# Patient Record
Sex: MALE | Race: WHITE | NOT HISPANIC OR LATINO | URBAN - METROPOLITAN AREA
[De-identification: names, ages, dates, MRNs, and addresses within clinical notes are randomized per-mention and may not be internally consistent; named-entity substitution may affect disease eponyms.]

---

## 2017-01-23 ENCOUNTER — OFFICE VISIT (OUTPATIENT)
Dept: FAMILY MEDICINE | Facility: CLINIC | Age: 27
End: 2017-01-23
Payer: COMMERCIAL

## 2017-01-23 VITALS
SYSTOLIC BLOOD PRESSURE: 126 MMHG | RESPIRATION RATE: 12 BRPM | TEMPERATURE: 97 F | DIASTOLIC BLOOD PRESSURE: 74 MMHG | BODY MASS INDEX: 42.66 KG/M2 | HEART RATE: 98 BPM | WEIGHT: 315 LBS | HEIGHT: 72 IN | OXYGEN SATURATION: 100 %

## 2017-01-23 DIAGNOSIS — F90.9 ATTENTION DEFICIT HYPERACTIVITY DISORDER (ADHD), UNSPECIFIED ADHD TYPE: ICD-10-CM

## 2017-01-23 DIAGNOSIS — F32.0 MAJOR DEPRESSIVE DISORDER, SINGLE EPISODE, MILD (H): Primary | ICD-10-CM

## 2017-01-23 DIAGNOSIS — Z51.81 MEDICATION MONITORING ENCOUNTER: ICD-10-CM

## 2017-01-23 DIAGNOSIS — F41.9 ANXIETY: ICD-10-CM

## 2017-01-23 DIAGNOSIS — Z79.899 CONTROLLED SUBSTANCE AGREEMENT SIGNED: ICD-10-CM

## 2017-01-23 DIAGNOSIS — E66.01 MORBID OBESITY, UNSPECIFIED OBESITY TYPE (H): ICD-10-CM

## 2017-01-23 PROCEDURE — 99214 OFFICE O/P EST MOD 30 MIN: CPT | Performed by: FAMILY MEDICINE

## 2017-01-23 RX ORDER — BUPROPION HYDROCHLORIDE 150 MG/1
300 TABLET ORAL EVERY MORNING
Qty: 180 TABLET | Refills: 3 | Status: SHIPPED | OUTPATIENT
Start: 2017-01-23

## 2017-01-23 RX ORDER — DEXTROAMPHETAMINE SACCHARATE, AMPHETAMINE ASPARTATE MONOHYDRATE, DEXTROAMPHETAMINE SULFATE AND AMPHETAMINE SULFATE 7.5; 7.5; 7.5; 7.5 MG/1; MG/1; MG/1; MG/1
30 CAPSULE, EXTENDED RELEASE ORAL DAILY
Qty: 30 CAPSULE | Refills: 0 | Status: SHIPPED | OUTPATIENT
Start: 2017-01-23 | End: 2017-02-21

## 2017-01-23 ASSESSMENT — ANXIETY QUESTIONNAIRES
GAD7 TOTAL SCORE: 4
5. BEING SO RESTLESS THAT IT IS HARD TO SIT STILL: NEARLY EVERY DAY
7. FEELING AFRAID AS IF SOMETHING AWFUL MIGHT HAPPEN: NOT AT ALL
IF YOU CHECKED OFF ANY PROBLEMS ON THIS QUESTIONNAIRE, HOW DIFFICULT HAVE THESE PROBLEMS MADE IT FOR YOU TO DO YOUR WORK, TAKE CARE OF THINGS AT HOME, OR GET ALONG WITH OTHER PEOPLE: SOMEWHAT DIFFICULT
3. WORRYING TOO MUCH ABOUT DIFFERENT THINGS: NOT AT ALL
2. NOT BEING ABLE TO STOP OR CONTROL WORRYING: NOT AT ALL
1. FEELING NERVOUS, ANXIOUS, OR ON EDGE: NOT AT ALL
6. BECOMING EASILY ANNOYED OR IRRITABLE: SEVERAL DAYS

## 2017-01-23 ASSESSMENT — PATIENT HEALTH QUESTIONNAIRE - PHQ9: 5. POOR APPETITE OR OVEREATING: NOT AT ALL

## 2017-01-23 NOTE — PATIENT INSTRUCTIONS
[facility]                        To reach your care team during and after hours:   [phone]  To reach our pharmacy:        [phone]    Clinic Hours                        Our clinic hours are:    Monday   7:30 am to 7:00 pm                  Tuesday through Friday 7:30 am to 5:00 pm                             Saturday   8:00 am to 12:00 pm      Sunday   Closed      Pharmacy Hours                        Our pharmacy hours are:    Monday   8:30 am to 7:00 pm       Tuesday to Friday  8:30 am to 6:00 pm                       Saturday    9:00 am to 1:00 pm              Sunday    Closed              There is also information available at our web site:  [email or web address]    If your provider ordered any lab tests and you do not receive the results within 10 business days, please call the clinic.    If you need a medication refill please contact your pharmacy.  Please allow 2-3 business days for your refill to be completed.    Our clinic offers telephone visits and e visits.  Please ask one of your team members to explain more.      Use [email or web address] (secure email communication and access to your chart) to send your primary care provider a message or make an appointment. Ask someone on your Team how to sign up for [employer].  Immunizations                      Immunization History   Administered Date(s) Administered     DTAP (<7y) 1990, 1990, 08/27/1991, 09/28/1992, 08/25/1995     HIB 04/16/1991     Hepatitis B 03/28/2002, 05/01/2002, 10/01/2002     IPV 1990, 1990, 08/27/1991, 08/25/1995     Influenza (IIV3) 03/18/2008     MMR 08/27/1991, 03/28/2002     Meningococcal (Menomune ) 07/15/2008     TD (ADULT, 7+) 10/01/2002     TDAP (BOOSTRIX AGES 10-64) 06/14/2012        Health Maintenance                         There are no preventive care reminders to display for this patient.  Wellbutrin-2 150 mg tablets 1x per day.     [facility]

## 2017-01-23 NOTE — PROGRESS NOTES
SUBJECTIVE:                                                    Aaron Bay is a 26 year old male who presents to clinic today for the following health issues:      Depression Followup      Status since last visit: Stable     See PHQ-9 for current symptoms.  Other associated symptoms: None    Complicating factors:   Significant life event:  No   Current substance abuse:  None  Anxiety or Panic symptoms:  No    PHQ-9:10  SONI-7: 4      PHQ-9  English PHQ-9   Any Language        Depression:     Medication Followup of Adderall      Taking Medication as prescribed: yes    Side Effects:  None    Medication Helping Symptoms:  yes     ADD: Adderall 30 mg everyday.  Appetite is good. No sleep issues reported.   Weigh gain noted.     Wellbutrin- 150 mg  Upgrading to 150 mg x2 per day     Problem list and histories reviewed & adjusted, as indicated.  Additional history: as documented      BP Readings from Last 3 Encounters:   01/23/17 126/74   06/17/16 120/64   05/20/16 138/76       Wt Readings from Last 4 Encounters:   01/23/17 320 lb (145.151 kg)   06/17/16 310 lb (140.615 kg)   05/20/16 315 lb (142.883 kg)   11/23/15 330 lb (149.687 kg)       Health Maintenance    There are no preventive care reminders to display for this patient.    Current Problem List    Patient Active Problem List   Diagnosis     CARDIOVASCULAR SCREENING; LDL GOAL LESS THAN 160     Anxiety     Controlled substance agreement signed     Attention deficit hyperactivity disorder (ADHD), unspecified ADHD type     Mild major depression (H)     Morbid obesity (H)       Past Medical History    Past Medical History   Diagnosis Date     Obesity      Controlled substance agreement signed 6/16     Attention deficit hyperactivity disorder (ADHD), unspecified ADHD type 1995     Mild major depression (H)      Anxiety      Morbid obesity (H)        Past Surgical History    Past Surgical History   Procedure Laterality Date     No history of surgery          Current Medications    Current Outpatient Prescriptions   Medication Sig Dispense Refill     buPROPion (WELLBUTRIN XL) 150 MG 24 hr tablet Take 2 tablets (300 mg) by mouth every morning 180 tablet 3     amphetamine-dextroamphetamine (ADDERALL XR) 30 MG per 24 hr capsule Take 1 capsule (30 mg) by mouth daily 30 capsule 0       Allergies    No Known Allergies    Immunizations    Immunization History   Administered Date(s) Administered     DTAP (<7y) 1990, 1990, 08/27/1991, 09/28/1992, 08/25/1995     HIB 04/16/1991     Hepatitis B 03/28/2002, 05/01/2002, 10/01/2002     IPV 1990, 1990, 08/27/1991, 08/25/1995     Influenza (IIV3) 03/18/2008     MMR 08/27/1991, 03/28/2002     Meningococcal (Menomune ) 07/15/2008     TD (ADULT, 7+) 10/01/2002     TDAP (BOOSTRIX AGES 10-64) 06/14/2012       Family History    Family History   Problem Relation Age of Onset     CANCER Paternal Grandmother      Lung     CANCER Paternal Grandfather      Lung      Myocardial Infarction Paternal Grandfather 52     Depression Mother      schizophrenic, manic     Depression Father      bipolar      Arthritis Maternal Grandmother      RA       Social History    Social History     Social History     Marital Status:      Spouse Name: N/A     Number of Children: N/A     Years of Education: N/A     Occupational History     Not on file.     Social History Main Topics     Smoking status: Former Smoker     Quit date: 05/15/2012     Smokeless tobacco: Never Used     Alcohol Use: Yes      Comment: occasionally     Drug Use: No     Sexual Activity:     Partners: Female     Other Topics Concern      Service No     Blood Transfusions No     Exercise No     encourage to do so      Seat Belt Yes     Self-Exams Yes     Social History Narrative       All above reviewed and updated, all stable unless otherwise noted    Recent labs reviewed    ROS:  Constitutional, HEENT, cardiovascular, pulmonary, GI, , musculoskeletal,  neuro, skin, endocrine and psych systems are negative, except as in HPI or otherwise noted       OBJECTIVE:                                                    /74 mmHg  Pulse 98  Temp(Src) 97  F (36.1  C) (Tympanic)  Resp 12  Ht 6' (1.829 m)  Wt 320 lb (145.151 kg)  BMI 43.39 kg/m2  SpO2 100%  Body mass index is 43.39 kg/(m^2).    GENERAL: healthy, alert and no distress, morbidly obese  EYES: Eyes grossly normal to inspection, extraocular movements - intact, and PERRL  HENT: ear canals- normal; TMs- normal; Nose- normal; Mouth- no ulcers, no lesions  NECK: no tenderness, no adenopathy, no asymmetry, no masses, no stiffness; thyroid- normal to palpation  RESP: lungs clear to auscultation - no rales, no rhonchi, no wheezes  CV: regular rates and rhythm, normal S1 S2, no S3 or S4 and no murmur, no click or rub -  ABDOMEN: soft, no tenderness, no  hepatosplenomegaly, no masses, normal bowel sounds  MS: extremities- no gross deformities noted, no edema  SKIN: no suspicious lesions, no rashes  NEURO: mentation intact and speech normal  BACK: no CVA tenderness, no paralumbar tenderness  PSYCH: Alert and oriented times 3; speech- coherent , normal rate and volume; able to articulate logical thoughts, able to abstract reason, no tangential thoughts, no hallucinations or delusions, affect- normal    DIAGNOSTICS/PROCEDURES:                                                      Results for orders placed or performed in visit on 07/03/12   CBC with platelets differential   Result Value Ref Range    WBC 13.7 (H) 4.0 - 11.0 10e9/L    RBC Count 4.68 4.4 - 5.9 10e12/L    Hemoglobin 13.4 13.3 - 17.7 g/dL    Hematocrit 40.2 40.0 - 53.0 %    MCV 86 78 - 100 fl    MCH 28.6 26.5 - 33.0 pg    MCHC 33.3 31.5 - 36.5 g/dL    RDW 13.4 10.0 - 15.0 %    Platelet Count 348 150 - 450 10e9/L    Diff Method Automated Method     % Neutrophils 70.3 40 - 75 %    % Lymphocytes 23.2 20 - 48 %    % Monocytes 5.2 0 - 12 %    % Eosinophils 1.2  0 - 6 %    % Basophils 0.1 0 - 2 %    Absolute Neutrophil 9.6 (H) 1.6 - 8.3 10e9/L    Absolute Lymphocytes 3.2 0.8 - 5.3 10e9/L    Absolute Monocytes 0.7 0.0 - 1.3 10e9/L    Absolute Eosinophils 0.2 0.0 - 0.7 10e9/L    Absolute Basophils 0.0 0.0 - 0.2 10e9/L   Comprehensive metabolic panel   Result Value Ref Range    Sodium 142 133 - 144 mmol/L    Potassium 4.5 3.4 - 5.3 mmol/L    Chloride 102 94 - 109 mmol/L    Carbon Dioxide 28 20 - 32 mmol/L    Anion Gap 11 6 - 17 mmol/L    Glucose 91 60 - 99 mg/dL    Urea Nitrogen 17 5 - 24 mg/dL    Creatinine 0.91 0.66 - 1.25 mg/dL    GFR Estimate >90 >60 mL/min/1.7m2    GFR Estimate If Black >90 >60 mL/min/1.7m2    Calcium 9.2 8.5 - 10.4 mg/dL    Bilirubin Total 0.7 0.2 - 1.3 mg/dL    Albumin 4.2 3.9 - 5.1 g/dL    Protein Total 7.7 6.8 - 8.8 g/dL    Alkaline Phosphatase 109 40 - 150 U/L    ALT 17 0 - 70 U/L    AST 21 0 - 45 U/L   TSH with free T4 reflex   Result Value Ref Range    TSH 4.12 0.4 - 5.0 mU/L   *UA relfex to Microscopic   Result Value Ref Range    Color Urine Yellow     Appearance Urine Clear     Glucose Urine Negative NEG mg/dL    Bilirubin Urine Negative NEG    Ketones Urine Negative NEG mg/dL    Specific Gravity Urine >1.030 1.003 - 1.035    Blood Urine Negative NEG    pH Urine 5.0 5.0 - 7.0 pH    Protein Albumin Urine Negative NEG mg/dL    Urobilinogen Urine 0.2 0.2 - 1.0 EU/dL    Nitrite Urine Negative NEG    Leukocyte Esterase Urine Trace (A) NEG    Source Midstream Urine    Lipid panel reflex to direct LDL   Result Value Ref Range    Cholesterol 191 0 - 200 mg/dL    Triglycerides 178 (H) 0 - 150 mg/dL    HDL Cholesterol 31 (L) 40 - 110 mg/dL    LDL Cholesterol Calculated 124 0 - 129 mg/dL    VLDL-Cholesterol 36 (H) 0 - 30 mg/dL    Cholesterol/HDL Ratio 6.2 (H) 0.0 - 5.0   Urine Microscopic   Result Value Ref Range    WBC Urine 10-25 (A) 0 - 2 /HPF    RBC Urine O - 2 0 - 2 /HPF    Bacteria Urine Moderate (A) NEG /HPF    Mucous Urine Present (A) NEG /LPF    Urine culture   Result Value Ref Range    Specimen Description Midstream Urine     Culture Micro       10 to 50,000 colonies/mL Multiple species present, probable perineal   contamination.    Micro Report Status FINAL 07/05/2012         ASSESSMENT/PLAN:                                                        ICD-10-CM    1. Major depressive disorder, single episode, mild (H) F32.0 buPROPion (WELLBUTRIN XL) 150 MG 24 hr tablet   2. Anxiety F41.9 buPROPion (WELLBUTRIN XL) 150 MG 24 hr tablet   3. Attention deficit hyperactivity disorder (ADHD), unspecified ADHD type F90.9 amphetamine-dextroamphetamine (ADDERALL XR) 30 MG per 24 hr capsule   4. Controlled substance agreement signed Z79.899 amphetamine-dextroamphetamine (ADDERALL XR) 30 MG per 24 hr capsule   5. Morbid obesity, unspecified obesity type (H) E66.01    6. Medication monitoring encounter Z51.81        Discussed treatment/modality options, including risk and benefits, he desires advised diet, exercise, and weight loss, further health care maintenance, medication change(s), medication refill(s), ADD diagnosis and treatment reviewed, with close follow up and PHQ-9, Depression Action Plan, completed and reviewed. All diagnosis above reviewed and noted above, otherwise stable.  See [facility] orders for further details.  Follow up in 6 month(s) and as needed.    Continue adderall, CSA UTD  Increase welbutrin from 150 to 300  Follow up in 1 month    There are no preventive care reminders to display for this patient.    See Patient Instructions    This document serves as a record of the services and decisions personally performed and made by MD ANTHONY Valdez. It was created on their behalf by Celso Medina and Celso York, a trained medical scribe. The creation of this document is based the provider's statements to the medical scribe.  Celso Medina/Celso York January 23, 2017 4:16 PM                 MD ANTHONY ValdezPaul [place]ille [address]  [place]  [address]  [phone] Fax

## 2017-01-23 NOTE — NURSING NOTE
Chief Complaint   Patient presents with     Recheck Medication       Initial /74 mmHg  Pulse 98  Temp(Src) 97  F (36.1  C) (Tympanic)  Resp 12  Ht 6' (1.829 m)  Wt 320 lb (145.151 kg)  BMI 43.39 kg/m2  SpO2 100% Estimated body mass index is 43.39 kg/(m^2) as calculated from the following:    Height as of this encounter: 6' (1.829 m).    Weight as of this encounter: 320 lb (145.151 kg).  BP completed using cuff size: large

## 2017-01-24 ASSESSMENT — ANXIETY QUESTIONNAIRES: GAD7 TOTAL SCORE: 4

## 2017-01-24 ASSESSMENT — PATIENT HEALTH QUESTIONNAIRE - PHQ9: SUM OF ALL RESPONSES TO PHQ QUESTIONS 1-9: 10

## 2017-01-26 NOTE — PROGRESS NOTES
SUBJECTIVE:                                                    Aaron Bay is a 26 year old male who presents to clinic today for the following health issues:      Depression Followup    Status since last visit: Stable     See PHQ-9 for current symptoms.  Other associated symptoms: None    Complicating factors:   Significant life event:  No   Current substance abuse:  None  Anxiety or Panic symptoms:  No    PHQ-9:10  SONI-7: 4      PHQ-9  English PHQ-9   Any Language        Depression:     Medication Followup of Adderall    Taking Medication as prescribed: yes    Side Effects:  None    Medication Helping Symptoms:  yes     ADD: Adderall 30 mg everyday.  Appetite is good. No sleep issues reported.   Weigh gain noted.     Wellbutrin- 150 mg  Upgrading to 150 mg x2 per day     Problem list and histories reviewed & adjusted, as indicated.  Additional history: as documented      BP Readings from Last 3 Encounters:   01/23/17 126/74   06/17/16 120/64   05/20/16 138/76       Wt Readings from Last 4 Encounters:   01/23/17 320 lb (145.151 kg)   06/17/16 310 lb (140.615 kg)   05/20/16 315 lb (142.883 kg)   11/23/15 330 lb (149.687 kg)       Health Maintenance    There are no preventive care reminders to display for this patient.    Current Problem List    Patient Active Problem List   Diagnosis     CARDIOVASCULAR SCREENING; LDL GOAL LESS THAN 160     Anxiety     Controlled substance agreement signed     Attention deficit hyperactivity disorder (ADHD), unspecified ADHD type     Mild major depression (H)     Morbid obesity (H)       Past Medical History    Past Medical History   Diagnosis Date     Obesity      Controlled substance agreement signed 6/16     Attention deficit hyperactivity disorder (ADHD), unspecified ADHD type 1995     Mild major depression (H)      Anxiety      Morbid obesity (H)        Past Surgical History    Past Surgical History   Procedure Laterality Date     No history of surgery         Current  Medications    Current Outpatient Prescriptions   Medication Sig Dispense Refill     buPROPion (WELLBUTRIN XL) 150 MG 24 hr tablet Take 2 tablets (300 mg) by mouth every morning 180 tablet 3     amphetamine-dextroamphetamine (ADDERALL XR) 30 MG per 24 hr capsule Take 1 capsule (30 mg) by mouth daily 30 capsule 0       Allergies    No Known Allergies    Immunizations    Immunization History   Administered Date(s) Administered     DTAP (<7y) 1990, 1990, 08/27/1991, 09/28/1992, 08/25/1995     HIB 04/16/1991     Hepatitis B 03/28/2002, 05/01/2002, 10/01/2002     IPV 1990, 1990, 08/27/1991, 08/25/1995     Influenza (IIV3) 03/18/2008     MMR 08/27/1991, 03/28/2002     Meningococcal (Menomune ) 07/15/2008     TD (ADULT, 7+) 10/01/2002     TDAP (BOOSTRIX AGES 10-64) 06/14/2012       Family History    Family History   Problem Relation Age of Onset     CANCER Paternal Grandmother      Lung     CANCER Paternal Grandfather      Lung      Myocardial Infarction Paternal Grandfather 52     Depression Mother      schizophrenic, manic     Depression Father      bipolar      Arthritis Maternal Grandmother      RA       Social History    Social History     Social History     Marital Status:      Spouse Name: N/A     Number of Children: N/A     Years of Education: N/A     Occupational History     Not on file.     Social History Main Topics     Smoking status: Former Smoker     Quit date: 05/15/2012     Smokeless tobacco: Never Used     Alcohol Use: Yes      Comment: occasionally     Drug Use: No     Sexual Activity:     Partners: Female     Other Topics Concern      Service No     Blood Transfusions No     Exercise No     encourage to do so      Seat Belt Yes     Self-Exams Yes     Social History Narrative       All above reviewed and updated, all stable unless otherwise noted    Recent labs reviewed    ROS:  Constitutional, HEENT, cardiovascular, pulmonary, GI, , musculoskeletal, neuro, skin,  endocrine and psych systems are negative, except as in HPI or otherwise noted       OBJECTIVE:                                                    /74 mmHg  Pulse 98  Temp(Src) 97  F (36.1  C) (Tympanic)  Resp 12  Ht 6' (1.829 m)  Wt 320 lb (145.151 kg)  BMI 43.39 kg/m2  SpO2 100%  Body mass index is 43.39 kg/(m^2).    GENERAL: healthy, alert and no distress, morbidly obese  EYES: Eyes grossly normal to inspection, extraocular movements - intact, and PERRL  HENT: ear canals- normal; TMs- normal; Nose- normal; Mouth- no ulcers, no lesions  NECK: no tenderness, no adenopathy, no asymmetry, no masses, no stiffness; thyroid- normal to palpation  RESP: lungs clear to auscultation - no rales, no rhonchi, no wheezes  CV: regular rates and rhythm, normal S1 S2, no S3 or S4 and no murmur, no click or rub -  ABDOMEN: soft, no tenderness, no  hepatosplenomegaly, no masses, normal bowel sounds  MS: extremities- no gross deformities noted, no edema  SKIN: no suspicious lesions, no rashes  NEURO: mentation intact and speech normal  BACK: no CVA tenderness, no paralumbar tenderness  PSYCH: Alert and oriented times 3; speech- coherent , normal rate and volume; able to articulate logical thoughts, able to abstract reason, no tangential thoughts, no hallucinations or delusions, affect- normal    DIAGNOSTICS/PROCEDURES:                                                      Results for orders placed or performed in visit on 07/03/12   CBC with platelets differential   Result Value Ref Range    WBC 13.7 (H) 4.0 - 11.0 10e9/L    RBC Count 4.68 4.4 - 5.9 10e12/L    Hemoglobin 13.4 13.3 - 17.7 g/dL    Hematocrit 40.2 40.0 - 53.0 %    MCV 86 78 - 100 fl    MCH 28.6 26.5 - 33.0 pg    MCHC 33.3 31.5 - 36.5 g/dL    RDW 13.4 10.0 - 15.0 %    Platelet Count 348 150 - 450 10e9/L    Diff Method Automated Method     % Neutrophils 70.3 40 - 75 %    % Lymphocytes 23.2 20 - 48 %    % Monocytes 5.2 0 - 12 %    % Eosinophils 1.2 0 - 6 %    %  Basophils 0.1 0 - 2 %    Absolute Neutrophil 9.6 (H) 1.6 - 8.3 10e9/L    Absolute Lymphocytes 3.2 0.8 - 5.3 10e9/L    Absolute Monocytes 0.7 0.0 - 1.3 10e9/L    Absolute Eosinophils 0.2 0.0 - 0.7 10e9/L    Absolute Basophils 0.0 0.0 - 0.2 10e9/L   Comprehensive metabolic panel   Result Value Ref Range    Sodium 142 133 - 144 mmol/L    Potassium 4.5 3.4 - 5.3 mmol/L    Chloride 102 94 - 109 mmol/L    Carbon Dioxide 28 20 - 32 mmol/L    Anion Gap 11 6 - 17 mmol/L    Glucose 91 60 - 99 mg/dL    Urea Nitrogen 17 5 - 24 mg/dL    Creatinine 0.91 0.66 - 1.25 mg/dL    GFR Estimate >90 >60 mL/min/1.7m2    GFR Estimate If Black >90 >60 mL/min/1.7m2    Calcium 9.2 8.5 - 10.4 mg/dL    Bilirubin Total 0.7 0.2 - 1.3 mg/dL    Albumin 4.2 3.9 - 5.1 g/dL    Protein Total 7.7 6.8 - 8.8 g/dL    Alkaline Phosphatase 109 40 - 150 U/L    ALT 17 0 - 70 U/L    AST 21 0 - 45 U/L   TSH with free T4 reflex   Result Value Ref Range    TSH 4.12 0.4 - 5.0 mU/L   *UA relfex to Microscopic   Result Value Ref Range    Color Urine Yellow     Appearance Urine Clear     Glucose Urine Negative NEG mg/dL    Bilirubin Urine Negative NEG    Ketones Urine Negative NEG mg/dL    Specific Gravity Urine >1.030 1.003 - 1.035    Blood Urine Negative NEG    pH Urine 5.0 5.0 - 7.0 pH    Protein Albumin Urine Negative NEG mg/dL    Urobilinogen Urine 0.2 0.2 - 1.0 EU/dL    Nitrite Urine Negative NEG    Leukocyte Esterase Urine Trace (A) NEG    Source Midstream Urine    Lipid panel reflex to direct LDL   Result Value Ref Range    Cholesterol 191 0 - 200 mg/dL    Triglycerides 178 (H) 0 - 150 mg/dL    HDL Cholesterol 31 (L) 40 - 110 mg/dL    LDL Cholesterol Calculated 124 0 - 129 mg/dL    VLDL-Cholesterol 36 (H) 0 - 30 mg/dL    Cholesterol/HDL Ratio 6.2 (H) 0.0 - 5.0   Urine Microscopic   Result Value Ref Range    WBC Urine 10-25 (A) 0 - 2 /HPF    RBC Urine O - 2 0 - 2 /HPF    Bacteria Urine Moderate (A) NEG /HPF    Mucous Urine Present (A) NEG /LPF   Urine culture    Result Value Ref Range    Specimen Description Midstream Urine     Culture Micro       10 to 50,000 colonies/mL Multiple species present, probable perineal   contamination.    Micro Report Status FINAL 07/05/2012         ASSESSMENT/PLAN:                                                        ICD-10-CM    1. Major depressive disorder, single episode, mild (H) F32.0 buPROPion (WELLBUTRIN XL) 150 MG 24 hr tablet   2. Anxiety F41.9 buPROPion (WELLBUTRIN XL) 150 MG 24 hr tablet   3. Attention deficit hyperactivity disorder (ADHD), unspecified ADHD type F90.9 amphetamine-dextroamphetamine (ADDERALL XR) 30 MG per 24 hr capsule   4. Controlled substance agreement signed Z79.899 amphetamine-dextroamphetamine (ADDERALL XR) 30 MG per 24 hr capsule   5. Morbid obesity, unspecified obesity type (H) E66.01    6. Medication monitoring encounter Z51.81        Discussed treatment/modality options, including risk and benefits, he desires advised diet, exercise, and weight loss, further health care maintenance, medication change(s), medication refill(s), ADD diagnosis and treatment reviewed, with close follow up and PHQ-9, Depression Action Plan, completed and reviewed. All diagnosis above reviewed and noted above, otherwise stable.  See [facility] orders for further details.  Follow up in 6 month(s) and as needed.    There are no preventive care reminders to display for this patient.    See Patient Instructions    This document serves as a record of the services and decisions personally performed and made by Praneeth Arnold MD [facility]. It was created on their behalf by Celso Medina and Celso York, a trained medical scribe. The creation of this document is based the provider's statements to the medical scribe.  Celso Medina/Celso York January 23, 2017 4:16 PM                 MD ANTHONY ValdzeFP  [address]  [address] [phone] Fax

## 2017-02-21 DIAGNOSIS — F32.0 MAJOR DEPRESSIVE DISORDER, SINGLE EPISODE, MILD (H): ICD-10-CM

## 2017-02-21 DIAGNOSIS — F41.9 ANXIETY: ICD-10-CM

## 2017-02-21 DIAGNOSIS — Z79.899 CONTROLLED SUBSTANCE AGREEMENT SIGNED: ICD-10-CM

## 2017-02-21 DIAGNOSIS — F90.9 ATTENTION DEFICIT HYPERACTIVITY DISORDER (ADHD), UNSPECIFIED ADHD TYPE: ICD-10-CM

## 2017-02-21 NOTE — TELEPHONE ENCOUNTER
Wellbutrin       Last Written Prescription Date: 01/23/2017  Last Fill Quantity: 180; # refills: 3  Last Office Visit with [facility], [facility] or [facility] prescribing provider:  01/23/2017        Last PHQ-9 score on record=   PHQ-9 SCORE 1/23/2017   Total Score -   Total Score 10     SONI-7 SCORE 11/23/2015 6/17/2016 1/23/2017   Total Score - - -   Total Score 2 2 4           Lab Results   Component Value Date    AST 21 07/03/2012     Lab Results   Component Value Date    ALT 17 07/03/2012       Controlled Substance Refill Request for Adderall  Problem List Complete:  Yes    Last Written Prescription Date:  01/23/2017  Last Fill Quantity: 30,   # refills: 0    Last Office Visit with [facility] primary care provider: 01/23/2017    Clinic visit frequency required: Q 4 months     Future Office visit:     Controlled substance agreement on file: Yes:  Date 11/18/2014.     Processing:  Staff will hand deliver Rx to on-site pharmacy   checked in past 6 months?  No, route to RN

## 2017-02-23 RX ORDER — BUPROPION HYDROCHLORIDE 150 MG/1
300 TABLET ORAL EVERY MORNING
Qty: 180 TABLET | Refills: 3 | OUTPATIENT
Start: 2017-02-23

## 2017-02-23 NOTE — TELEPHONE ENCOUNTER
Should have refills on the Wellbutrin     Please review the adderall     Thank you     Ellen Cherry RN, BSN  [facility]

## 2017-02-24 RX ORDER — DEXTROAMPHETAMINE SACCHARATE, AMPHETAMINE ASPARTATE MONOHYDRATE, DEXTROAMPHETAMINE SULFATE AND AMPHETAMINE SULFATE 7.5; 7.5; 7.5; 7.5 MG/1; MG/1; MG/1; MG/1
30 CAPSULE, EXTENDED RELEASE ORAL DAILY
Qty: 30 CAPSULE | Refills: 0 | Status: SHIPPED | OUTPATIENT
Start: 2017-02-24

## 2020-11-22 ENCOUNTER — HEALTH MAINTENANCE LETTER (OUTPATIENT)
Age: 30
End: 2020-11-22

## 2021-09-19 ENCOUNTER — HEALTH MAINTENANCE LETTER (OUTPATIENT)
Age: 31
End: 2021-09-19

## 2022-01-09 ENCOUNTER — HEALTH MAINTENANCE LETTER (OUTPATIENT)
Age: 32
End: 2022-01-09

## 2022-11-21 ENCOUNTER — HEALTH MAINTENANCE LETTER (OUTPATIENT)
Age: 32
End: 2022-11-21

## 2023-04-16 ENCOUNTER — HEALTH MAINTENANCE LETTER (OUTPATIENT)
Age: 33
End: 2023-04-16

## 2024-11-19 ENCOUNTER — LAB REQUISITION (OUTPATIENT)
Dept: ADMINISTRATIVE | Age: 34
End: 2024-11-19
Payer: COMMERCIAL

## 2024-11-19 DIAGNOSIS — Z00.00 HEALTHCARE MAINTENANCE: ICD-10-CM

## 2024-11-19 LAB
B-HCG UR QL: NEGATIVE
BILIRUB UR QL: NEGATIVE
CLARITY UR: CLEAR
COLOR UR: COLORLESS
GLUCOSE UR-MCNC: NORMAL MG/DL
HGB UR QL STRIP.AUTO: NEGATIVE
KETONES UR-MCNC: 20 MG/DL
LEUKOCYTE ESTERASE UR QL STRIP.AUTO: NEGATIVE
NITRITE UR QL STRIP.AUTO: NEGATIVE
PH UR: 6 [PH] (ref 5–8)
PROT UR-MCNC: NEGATIVE MG/DL
SP GR UR STRIP: <1.005 (ref 1–1.03)
UROBILINOGEN UR STRIP-ACNC: NORMAL

## 2024-11-19 PROCEDURE — 81003 URINALYSIS AUTO W/O SCOPE: CPT | Performed by: OTHER

## 2024-11-19 PROCEDURE — 81025 URINE PREGNANCY TEST: CPT | Performed by: OTHER

## 2024-11-20 LAB
ALBUMIN SERPL-MCNC: 4.8 G/DL (ref 3.2–4.8)
ALBUMIN SERPL-MCNC: 4.8 G/DL (ref 3.2–4.8)
ALBUMIN/GLOB SERPL: 1.5 {RATIO} (ref 1–2)
ALP LIVER SERPL-CCNC: 105 U/L
ALP LIVER SERPL-CCNC: 105 U/L
ALT SERPL-CCNC: 56 U/L
ALT SERPL-CCNC: 56 U/L
AMYLASE SERPL-CCNC: 29 U/L (ref 30–118)
ANION GAP SERPL CALC-SCNC: 11 MMOL/L (ref 0–18)
AST SERPL-CCNC: 33 U/L (ref ?–34)
AST SERPL-CCNC: 33 U/L (ref ?–34)
BASOPHILS # BLD AUTO: 0.02 X10(3) UL (ref 0–0.2)
BASOPHILS NFR BLD AUTO: 0.2 %
BILIRUB DIRECT SERPL-MCNC: 0.1 MG/DL (ref ?–0.3)
BILIRUB SERPL-MCNC: 0.4 MG/DL (ref 0.3–1.2)
BILIRUB SERPL-MCNC: 0.4 MG/DL (ref 0.3–1.2)
BUN BLD-MCNC: 9 MG/DL (ref 9–23)
BUN/CREAT SERPL: 9 (ref 10–20)
CALCIUM BLD-MCNC: 9.9 MG/DL (ref 8.7–10.4)
CHLORIDE SERPL-SCNC: 101 MMOL/L (ref 98–112)
CHOLEST SERPL-MCNC: 189 MG/DL (ref ?–200)
CO2 SERPL-SCNC: 23 MMOL/L (ref 21–32)
CREAT BLD-MCNC: 1 MG/DL
DEPRECATED RDW RBC AUTO: 42 FL (ref 35.1–46.3)
EGFRCR SERPLBLD CKD-EPI 2021: 101 ML/MIN/1.73M2 (ref 60–?)
EOSINOPHIL # BLD AUTO: 0.08 X10(3) UL (ref 0–0.7)
EOSINOPHIL NFR BLD AUTO: 0.7 %
ERYTHROCYTE [DISTWIDTH] IN BLOOD BY AUTOMATED COUNT: 13.3 % (ref 11–15)
FASTING PATIENT LIPID ANSWER: YES
FASTING STATUS PATIENT QL REPORTED: YES
GLOBULIN PLAS-MCNC: 3.3 G/DL (ref 2–3.5)
GLUCOSE BLD-MCNC: 62 MG/DL (ref 70–99)
HCT VFR BLD AUTO: 37.3 %
HDLC SERPL-MCNC: 37 MG/DL (ref 40–59)
HGB BLD-MCNC: 12.9 G/DL
IMM GRANULOCYTES # BLD AUTO: 0.05 X10(3) UL (ref 0–1)
IMM GRANULOCYTES NFR BLD: 0.4 %
LDLC SERPL CALC-MCNC: 129 MG/DL (ref ?–100)
LYMPHOCYTES # BLD AUTO: 2.47 X10(3) UL (ref 1–4)
LYMPHOCYTES NFR BLD AUTO: 20.4 %
MAGNESIUM SERPL-MCNC: 2.2 MG/DL (ref 1.6–2.6)
MCH RBC QN AUTO: 30 PG (ref 26–34)
MCHC RBC AUTO-ENTMCNC: 34.6 G/DL (ref 31–37)
MCV RBC AUTO: 86.7 FL
MONOCYTES # BLD AUTO: 0.69 X10(3) UL (ref 0.1–1)
MONOCYTES NFR BLD AUTO: 5.7 %
NEUTROPHILS # BLD AUTO: 8.8 X10 (3) UL (ref 1.5–7.7)
NEUTROPHILS # BLD AUTO: 8.8 X10(3) UL (ref 1.5–7.7)
NEUTROPHILS NFR BLD AUTO: 72.6 %
NONHDLC SERPL-MCNC: 152 MG/DL (ref ?–130)
OSMOLALITY SERPL CALC.SUM OF ELEC: 277 MOSM/KG (ref 275–295)
PHOSPHATE SERPL-MCNC: 2.7 MG/DL (ref 2.4–5.1)
PLATELET # BLD AUTO: 468 10(3)UL (ref 150–450)
POTASSIUM SERPL-SCNC: 4.1 MMOL/L (ref 3.5–5.1)
PROT SERPL-MCNC: 8.1 G/DL (ref 5.7–8.2)
PROT SERPL-MCNC: 8.1 G/DL (ref 5.7–8.2)
RBC # BLD AUTO: 4.3 X10(6)UL
SODIUM SERPL-SCNC: 135 MMOL/L (ref 136–145)
TRIGL SERPL-MCNC: 129 MG/DL (ref 30–149)
TSI SER-ACNC: 1.48 UIU/ML (ref 0.55–4.78)
VIT D+METAB SERPL-MCNC: 32.2 NG/ML (ref 30–100)
VLDLC SERPL CALC-MCNC: 23 MG/DL (ref 0–30)
WBC # BLD AUTO: 12.1 X10(3) UL (ref 4–11)

## 2024-11-20 PROCEDURE — 36415 COLL VENOUS BLD VENIPUNCTURE: CPT | Performed by: OTHER

## 2024-11-20 PROCEDURE — 85025 COMPLETE CBC W/AUTO DIFF WBC: CPT | Performed by: OTHER

## 2024-11-20 PROCEDURE — 82306 VITAMIN D 25 HYDROXY: CPT | Performed by: OTHER

## 2024-11-20 PROCEDURE — 80061 LIPID PANEL: CPT | Performed by: OTHER

## 2024-11-20 PROCEDURE — 84100 ASSAY OF PHOSPHORUS: CPT | Performed by: OTHER

## 2024-11-20 PROCEDURE — 80053 COMPREHEN METABOLIC PANEL: CPT | Performed by: OTHER

## 2024-11-20 PROCEDURE — 82150 ASSAY OF AMYLASE: CPT | Performed by: OTHER

## 2024-11-20 PROCEDURE — 84443 ASSAY THYROID STIM HORMONE: CPT | Performed by: OTHER

## 2024-11-20 PROCEDURE — 82248 BILIRUBIN DIRECT: CPT | Performed by: OTHER

## 2024-11-20 PROCEDURE — 83735 ASSAY OF MAGNESIUM: CPT | Performed by: OTHER

## 2024-11-25 PROCEDURE — 81003 URINALYSIS AUTO W/O SCOPE: CPT | Performed by: NURSE PRACTITIONER

## 2024-12-02 ENCOUNTER — LAB REQUISITION (OUTPATIENT)
Dept: ADMINISTRATIVE | Age: 34
End: 2024-12-02
Payer: COMMERCIAL

## 2024-12-02 DIAGNOSIS — F39 MOOD DISORDER (HCC): ICD-10-CM

## 2024-12-02 LAB
BILIRUB UR QL: NEGATIVE
CLARITY UR: CLEAR
COLOR UR: YELLOW
GLUCOSE UR-MCNC: NORMAL MG/DL
HGB UR QL STRIP.AUTO: NEGATIVE
KETONES UR-MCNC: 10 MG/DL
LEUKOCYTE ESTERASE UR QL STRIP.AUTO: 500
NITRITE UR QL STRIP.AUTO: NEGATIVE
PH UR: 6 [PH] (ref 5–8)
SP GR UR STRIP: 1.03 (ref 1–1.03)
UROBILINOGEN UR STRIP-ACNC: NORMAL

## 2024-12-02 PROCEDURE — 81001 URINALYSIS AUTO W/SCOPE: CPT | Performed by: OTHER

## 2024-12-06 LAB
ALBUMIN SERPL-MCNC: 4.3 G/DL (ref 3.2–4.8)
ALBUMIN/GLOB SERPL: 1.7 {RATIO} (ref 1–2)
ALP LIVER SERPL-CCNC: 76 U/L
ALT SERPL-CCNC: 142 U/L
AMYLASE SERPL-CCNC: 24 U/L (ref 30–118)
ANION GAP SERPL CALC-SCNC: 6 MMOL/L (ref 0–18)
AST SERPL-CCNC: 67 U/L (ref ?–34)
BASOPHILS # BLD AUTO: 0.01 X10(3) UL (ref 0–0.2)
BASOPHILS NFR BLD AUTO: 0.1 %
BILIRUB SERPL-MCNC: 0.4 MG/DL (ref 0.3–1.2)
BUN BLD-MCNC: <5 MG/DL (ref 9–23)
CALCIUM BLD-MCNC: 9.5 MG/DL (ref 8.7–10.4)
CHLORIDE SERPL-SCNC: 106 MMOL/L (ref 98–112)
CHOLEST SERPL-MCNC: 165 MG/DL (ref ?–200)
CO2 SERPL-SCNC: 26 MMOL/L (ref 21–32)
CREAT BLD-MCNC: 0.96 MG/DL
DEPRECATED RDW RBC AUTO: 42.7 FL (ref 35.1–46.3)
EGFRCR SERPLBLD CKD-EPI 2021: 106 ML/MIN/1.73M2 (ref 60–?)
EOSINOPHIL # BLD AUTO: 0.11 X10(3) UL (ref 0–0.7)
EOSINOPHIL NFR BLD AUTO: 1.5 %
ERYTHROCYTE [DISTWIDTH] IN BLOOD BY AUTOMATED COUNT: 13.7 % (ref 11–15)
FASTING PATIENT LIPID ANSWER: YES
FASTING STATUS PATIENT QL REPORTED: YES
GLOBULIN PLAS-MCNC: 2.5 G/DL (ref 2–3.5)
GLUCOSE BLD-MCNC: 82 MG/DL (ref 70–99)
HCT VFR BLD AUTO: 34.8 %
HDLC SERPL-MCNC: 32 MG/DL (ref 40–59)
HGB BLD-MCNC: 11.9 G/DL
IMM GRANULOCYTES # BLD AUTO: 0.01 X10(3) UL (ref 0–1)
IMM GRANULOCYTES NFR BLD: 0.1 %
LDLC SERPL CALC-MCNC: 107 MG/DL (ref ?–100)
LYMPHOCYTES # BLD AUTO: 2.33 X10(3) UL (ref 1–4)
LYMPHOCYTES NFR BLD AUTO: 31.5 %
MAGNESIUM SERPL-MCNC: 2.1 MG/DL (ref 1.6–2.6)
MCH RBC QN AUTO: 29 PG (ref 26–34)
MCHC RBC AUTO-ENTMCNC: 34.2 G/DL (ref 31–37)
MCV RBC AUTO: 84.9 FL
MONOCYTES # BLD AUTO: 0.54 X10(3) UL (ref 0.1–1)
MONOCYTES NFR BLD AUTO: 7.3 %
NEUTROPHILS # BLD AUTO: 4.39 X10 (3) UL (ref 1.5–7.7)
NEUTROPHILS # BLD AUTO: 4.39 X10(3) UL (ref 1.5–7.7)
NEUTROPHILS NFR BLD AUTO: 59.5 %
NONHDLC SERPL-MCNC: 133 MG/DL (ref ?–130)
PHOSPHATE SERPL-MCNC: 3.6 MG/DL (ref 2.4–5.1)
PLATELET # BLD AUTO: 294 10(3)UL (ref 150–450)
POTASSIUM SERPL-SCNC: 4.2 MMOL/L (ref 3.5–5.1)
PROT SERPL-MCNC: 6.8 G/DL (ref 5.7–8.2)
RBC # BLD AUTO: 4.1 X10(6)UL
SODIUM SERPL-SCNC: 138 MMOL/L (ref 136–145)
TRIGL SERPL-MCNC: 146 MG/DL (ref 30–149)
TSI SER-ACNC: 2.47 UIU/ML (ref 0.55–4.78)
VIT D+METAB SERPL-MCNC: 31.1 NG/ML (ref 30–100)
VLDLC SERPL CALC-MCNC: 25 MG/DL (ref 0–30)
WBC # BLD AUTO: 7.4 X10(3) UL (ref 4–11)

## 2024-12-06 PROCEDURE — 84443 ASSAY THYROID STIM HORMONE: CPT | Performed by: OTHER

## 2024-12-06 PROCEDURE — 82150 ASSAY OF AMYLASE: CPT | Performed by: OTHER

## 2024-12-06 PROCEDURE — 85025 COMPLETE CBC W/AUTO DIFF WBC: CPT | Performed by: OTHER

## 2024-12-06 PROCEDURE — 80061 LIPID PANEL: CPT | Performed by: OTHER

## 2024-12-06 PROCEDURE — 83735 ASSAY OF MAGNESIUM: CPT | Performed by: OTHER

## 2024-12-06 PROCEDURE — 82306 VITAMIN D 25 HYDROXY: CPT | Performed by: OTHER

## 2024-12-06 PROCEDURE — 80053 COMPREHEN METABOLIC PANEL: CPT | Performed by: OTHER

## 2024-12-06 PROCEDURE — 84100 ASSAY OF PHOSPHORUS: CPT | Performed by: OTHER

## 2024-12-22 ENCOUNTER — LAB REQUISITION (OUTPATIENT)
Dept: ADMINISTRATIVE | Age: 34
End: 2024-12-22
Payer: COMMERCIAL

## 2024-12-22 DIAGNOSIS — F32.9 MAJOR DEPRESSIVE DISORDER: ICD-10-CM

## 2024-12-23 LAB
ALBUMIN SERPL-MCNC: 4.4 G/DL (ref 3.2–4.8)
ALBUMIN/GLOB SERPL: 1.5 {RATIO} (ref 1–2)
ALP LIVER SERPL-CCNC: 85 U/L
ALT SERPL-CCNC: 199 U/L
AMYLASE SERPL-CCNC: 28 U/L (ref 30–118)
ANION GAP SERPL CALC-SCNC: 10 MMOL/L (ref 0–18)
AST SERPL-CCNC: 74 U/L (ref ?–34)
BASOPHILS # BLD AUTO: 0.01 X10(3) UL (ref 0–0.2)
BASOPHILS NFR BLD AUTO: 0.1 %
BILIRUB SERPL-MCNC: 0.4 MG/DL (ref 0.3–1.2)
BUN BLD-MCNC: 8 MG/DL (ref 9–23)
BUN/CREAT SERPL: 9.1 (ref 10–20)
CALCIUM BLD-MCNC: 9.7 MG/DL (ref 8.7–10.4)
CHLORIDE SERPL-SCNC: 105 MMOL/L (ref 98–112)
CO2 SERPL-SCNC: 23 MMOL/L (ref 21–32)
CREAT BLD-MCNC: 0.88 MG/DL
DEPRECATED RDW RBC AUTO: 44.9 FL (ref 35.1–46.3)
EGFRCR SERPLBLD CKD-EPI 2021: 116 ML/MIN/1.73M2 (ref 60–?)
EOSINOPHIL # BLD AUTO: 0.12 X10(3) UL (ref 0–0.7)
EOSINOPHIL NFR BLD AUTO: 1.4 %
ERYTHROCYTE [DISTWIDTH] IN BLOOD BY AUTOMATED COUNT: 14.1 % (ref 11–15)
FASTING STATUS PATIENT QL REPORTED: YES
GLOBULIN PLAS-MCNC: 2.9 G/DL (ref 2–3.5)
GLUCOSE BLD-MCNC: 91 MG/DL (ref 70–99)
HCT VFR BLD AUTO: 39.7 %
HGB BLD-MCNC: 13.1 G/DL
IMM GRANULOCYTES # BLD AUTO: 0.02 X10(3) UL (ref 0–1)
IMM GRANULOCYTES NFR BLD: 0.2 %
LIPASE SERPL-CCNC: 27 U/L (ref 12–53)
LYMPHOCYTES # BLD AUTO: 2.28 X10(3) UL (ref 1–4)
LYMPHOCYTES NFR BLD AUTO: 26.1 %
MAGNESIUM SERPL-MCNC: 2 MG/DL (ref 1.6–2.6)
MCH RBC QN AUTO: 28.7 PG (ref 26–34)
MCHC RBC AUTO-ENTMCNC: 33 G/DL (ref 31–37)
MCV RBC AUTO: 87.1 FL
MONOCYTES # BLD AUTO: 0.48 X10(3) UL (ref 0.1–1)
MONOCYTES NFR BLD AUTO: 5.5 %
NEUTROPHILS # BLD AUTO: 5.82 X10 (3) UL (ref 1.5–7.7)
NEUTROPHILS # BLD AUTO: 5.82 X10(3) UL (ref 1.5–7.7)
NEUTROPHILS NFR BLD AUTO: 66.7 %
OSMOLALITY SERPL CALC.SUM OF ELEC: 284 MOSM/KG (ref 275–295)
PHOSPHATE SERPL-MCNC: 3.2 MG/DL (ref 2.4–5.1)
PLATELET # BLD AUTO: 370 10(3)UL (ref 150–450)
POTASSIUM SERPL-SCNC: 3.9 MMOL/L (ref 3.5–5.1)
PROT SERPL-MCNC: 7.3 G/DL (ref 5.7–8.2)
RBC # BLD AUTO: 4.56 X10(6)UL
SODIUM SERPL-SCNC: 138 MMOL/L (ref 136–145)
WBC # BLD AUTO: 8.7 X10(3) UL (ref 4–11)

## 2024-12-23 PROCEDURE — 85025 COMPLETE CBC W/AUTO DIFF WBC: CPT | Performed by: NURSE PRACTITIONER

## 2024-12-23 PROCEDURE — 82150 ASSAY OF AMYLASE: CPT | Performed by: NURSE PRACTITIONER

## 2024-12-23 PROCEDURE — 83735 ASSAY OF MAGNESIUM: CPT | Performed by: NURSE PRACTITIONER

## 2024-12-23 PROCEDURE — 80053 COMPREHEN METABOLIC PANEL: CPT | Performed by: NURSE PRACTITIONER

## 2024-12-23 PROCEDURE — 36415 COLL VENOUS BLD VENIPUNCTURE: CPT | Performed by: NURSE PRACTITIONER

## 2024-12-23 PROCEDURE — 83690 ASSAY OF LIPASE: CPT | Performed by: NURSE PRACTITIONER

## 2024-12-23 PROCEDURE — 84100 ASSAY OF PHOSPHORUS: CPT | Performed by: NURSE PRACTITIONER

## 2024-12-30 ENCOUNTER — LAB REQUISITION (OUTPATIENT)
Dept: ADMINISTRATIVE | Age: 34
End: 2024-12-30
Payer: COMMERCIAL

## 2024-12-30 DIAGNOSIS — Z00.00 HEALTH CARE MAINTENANCE: ICD-10-CM

## 2025-01-03 LAB
AMYLASE SERPL-CCNC: 26 U/L (ref 30–118)
ANION GAP SERPL CALC-SCNC: 5 MMOL/L (ref 0–18)
BUN BLD-MCNC: 5 MG/DL (ref 9–23)
BUN/CREAT SERPL: 5.7 (ref 10–20)
CALCIUM BLD-MCNC: 9.5 MG/DL (ref 8.7–10.4)
CHLORIDE SERPL-SCNC: 107 MMOL/L (ref 98–112)
CO2 SERPL-SCNC: 30 MMOL/L (ref 21–32)
CREAT BLD-MCNC: 0.88 MG/DL
EGFRCR SERPLBLD CKD-EPI 2021: 116 ML/MIN/1.73M2 (ref 60–?)
FASTING STATUS PATIENT QL REPORTED: YES
GLUCOSE BLD-MCNC: 80 MG/DL (ref 70–99)
MAGNESIUM SERPL-MCNC: 2 MG/DL (ref 1.6–2.6)
OSMOLALITY SERPL CALC.SUM OF ELEC: 290 MOSM/KG (ref 275–295)
PHOSPHATE SERPL-MCNC: 3.1 MG/DL (ref 2.4–5.1)
POTASSIUM SERPL-SCNC: 4 MMOL/L (ref 3.5–5.1)
SODIUM SERPL-SCNC: 142 MMOL/L (ref 136–145)

## 2025-01-03 PROCEDURE — 82150 ASSAY OF AMYLASE: CPT | Performed by: NURSE PRACTITIONER

## 2025-01-03 PROCEDURE — 80048 BASIC METABOLIC PNL TOTAL CA: CPT | Performed by: NURSE PRACTITIONER

## 2025-01-03 PROCEDURE — 83735 ASSAY OF MAGNESIUM: CPT | Performed by: NURSE PRACTITIONER

## 2025-01-03 PROCEDURE — 84100 ASSAY OF PHOSPHORUS: CPT | Performed by: NURSE PRACTITIONER

## 2025-01-12 PROBLEM — F33.2 MDD (MAJOR DEPRESSIVE DISORDER), RECURRENT EPISODE, SEVERE (HCC): Status: ACTIVE | Noted: 2025-01-12

## 2025-01-13 PROBLEM — F41.1 GAD (GENERALIZED ANXIETY DISORDER): Status: ACTIVE | Noted: 2025-01-13

## 2025-01-13 PROBLEM — F50.00 ANOREXIA NERVOSA, UNSPECIFIED (HCC): Status: ACTIVE | Noted: 2025-01-13

## 2025-01-13 PROBLEM — F43.10 PTSD (POST-TRAUMATIC STRESS DISORDER): Status: ACTIVE | Noted: 2025-01-13

## 2025-01-13 PROBLEM — F84.0 AUTISM SPECTRUM DISORDER (HCC): Status: ACTIVE | Noted: 2025-01-13

## 2025-01-13 PROBLEM — F44.81 DISSOCIATIVE IDENTITY DISORDER (HCC): Status: ACTIVE | Noted: 2025-01-13

## 2025-01-20 ENCOUNTER — HOSPITAL ENCOUNTER (OUTPATIENT)
Dept: GENERAL RADIOLOGY | Facility: HOSPITAL | Age: 35
Discharge: HOME | End: 2025-01-20
Attending: Other
Payer: COMMERCIAL

## 2025-01-20 PROCEDURE — 71045 X-RAY EXAM CHEST 1 VIEW: CPT | Performed by: OTHER
